# Patient Record
Sex: MALE | Race: WHITE | NOT HISPANIC OR LATINO | Employment: FULL TIME | URBAN - METROPOLITAN AREA
[De-identification: names, ages, dates, MRNs, and addresses within clinical notes are randomized per-mention and may not be internally consistent; named-entity substitution may affect disease eponyms.]

---

## 2020-11-16 ENCOUNTER — OCCUPATIONAL HEALTH (OUTPATIENT)
Dept: URGENT CARE | Facility: CLINIC | Age: 35
End: 2020-11-16

## 2020-11-16 VITALS — TEMPERATURE: 98 F

## 2020-11-16 DIAGNOSIS — Z02.83 ENCOUNTER FOR DRUG SCREENING: Primary | ICD-10-CM

## 2020-11-16 LAB
BREATH ALCOHOL: 0
CTP QC/QA: YES
POC 10 PANEL DRUG SCREEN: NEGATIVE

## 2020-11-16 PROCEDURE — 80305 DRUG TEST PRSMV DIR OPT OBS: CPT | Mod: QW,S$GLB,, | Performed by: EMERGENCY MEDICINE

## 2020-11-16 PROCEDURE — 80305 POCT RAPID DRUG SCREEN 10 PANEL: ICD-10-PCS | Mod: QW,S$GLB,, | Performed by: EMERGENCY MEDICINE

## 2020-11-16 PROCEDURE — 82075 POCT BREATH ALCOHOL TEST: ICD-10-PCS | Mod: S$GLB,,, | Performed by: EMERGENCY MEDICINE

## 2020-11-16 PROCEDURE — 82075 ASSAY OF BREATH ETHANOL: CPT | Mod: S$GLB,,, | Performed by: EMERGENCY MEDICINE

## 2023-11-21 ENCOUNTER — HOSPITAL ENCOUNTER (EMERGENCY)
Facility: HOSPITAL | Age: 38
Discharge: HOME | End: 2023-11-21
Attending: EMERGENCY MEDICINE

## 2023-11-21 VITALS
DIASTOLIC BLOOD PRESSURE: 76 MMHG | HEIGHT: 67 IN | SYSTOLIC BLOOD PRESSURE: 133 MMHG | RESPIRATION RATE: 18 BRPM | HEART RATE: 110 BPM | BODY MASS INDEX: 24.33 KG/M2 | TEMPERATURE: 99 F | WEIGHT: 155 LBS | OXYGEN SATURATION: 100 %
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
## 2023-11-21 PROCEDURE — 99284 EMERGENCY DEPT VISIT MOD MDM: CPT | Mod: ER

## 2023-11-21 PROCEDURE — 96372 THER/PROPH/DIAG INJ SC/IM: CPT | Performed by: PHYSICIAN ASSISTANT

## 2023-11-21 PROCEDURE — 90471 IMMUNIZATION ADMIN: CPT | Mod: ER | Performed by: PHYSICIAN ASSISTANT

## 2023-11-21 PROCEDURE — 63600175 PHARM REV CODE 636 W HCPCS: Mod: ER | Performed by: PHYSICIAN ASSISTANT

## 2023-11-21 PROCEDURE — 90715 TDAP VACCINE 7 YRS/> IM: CPT | Mod: ER | Performed by: PHYSICIAN ASSISTANT

## 2023-11-21 RX ORDER — HYDROCODONE BITARTRATE AND ACETAMINOPHEN 5; 325 MG/1; MG/1
1 TABLET ORAL EVERY 6 HOURS PRN
Qty: 12 TABLET | Refills: 0 | OUTPATIENT
Start: 2023-11-21 | End: 2023-12-06

## 2023-11-21 RX ORDER — KETOROLAC TROMETHAMINE 30 MG/ML
30 INJECTION, SOLUTION INTRAMUSCULAR; INTRAVENOUS
Status: COMPLETED | OUTPATIENT
Start: 2023-11-21 | End: 2023-11-21

## 2023-11-21 RX ORDER — AMOXICILLIN AND CLAVULANATE POTASSIUM 875; 125 MG/1; MG/1
1 TABLET, FILM COATED ORAL 2 TIMES DAILY
Qty: 14 TABLET | Refills: 0 | Status: SHIPPED | OUTPATIENT
Start: 2023-11-21

## 2023-11-21 RX ADMIN — KETOROLAC TROMETHAMINE 30 MG: 30 INJECTION, SOLUTION INTRAMUSCULAR; INTRAVENOUS at 01:11

## 2023-11-21 RX ADMIN — TETANUS TOXOID, REDUCED DIPHTHERIA TOXOID AND ACELLULAR PERTUSSIS VACCINE, ADSORBED 0.5 ML: 5; 2.5; 8; 8; 2.5 SUSPENSION INTRAMUSCULAR at 01:11

## 2023-11-21 NOTE — ED PROVIDER NOTES
Encounter Date: 11/21/2023       History     Chief Complaint   Patient presents with    Animal Bite      Patient reports that he was trying to stop his dogs from fighting. Dog bite noted to the left hand      This is a 38-year-old white male who is otherwise healthy that presents the ED with multiple puncture wounds noted to the left hand and fingers as well as left hand pain following him trying to break up an altercation between his 2 pit bulls.  He states they both recently had puppies and he woke up from a nap and they were fighting.  He states instincts took over and jumped in the middle of it and was bitten on multiple occasions.  He was unable to quantify his pain but does state that it as aching and throbbing.  He is able to move his fingers but not at baseline.  He denies any numbness, tingling, or altered sensation.  He is unsure of his last tetanus shot.      Review of patient's allergies indicates:  No Known Allergies  No past medical history on file.  No past surgical history on file.  No family history on file.     Review of Systems   Constitutional:  Negative for fever.   Respiratory:  Negative for cough and shortness of breath.    Cardiovascular:  Negative for chest pain and palpitations.   Gastrointestinal:  Negative for nausea and vomiting.   Skin:  Positive for wound.   Psychiatric/Behavioral:  The patient is nervous/anxious.        Physical Exam     Initial Vitals [11/21/23 1229]   BP Pulse Resp Temp SpO2   133/76 110 18 98.6 °F (37 °C) 100 %      MAP       --         Physical Exam    Constitutional: He appears well-developed and well-nourished.   HENT:   Head: Normocephalic and atraumatic.   Cardiovascular:  Normal rate, regular rhythm and normal heart sounds.           Pulmonary/Chest: Breath sounds normal. He has no wheezes.   Musculoskeletal:      Right hand: Normal.      Left hand: Swelling and bony tenderness present. No deformity or lacerations. Decreased range of motion. Normal strength.  Normal sensation. Normal capillary refill. Normal pulse.      Comments: There are 6 puncture wounds noted over the posterior aspect of the left 2nd and 3rd fingers.  There is also a singular puncture wound with small skin tear noted over the anterior portion of the left palm over the distal 2nd metacarpal.  The patient has some swelling noted as well over these areas.  He has decreased range of motion secondary to pain.  Motor and sensory intact.  2+ radial pulse.     Neurological: He is alert and oriented to person, place, and time. He has normal strength and normal reflexes.   Skin: Capillary refill takes less than 2 seconds.   Psychiatric: He has a normal mood and affect. Thought content normal.         ED Course   Procedures  Labs Reviewed - No data to display       Imaging Results              X-Ray Hand 3 View Left (Final result)  Result time 11/21/23 13:18:31      Final result by Tom Tracy MD (11/21/23 13:18:31)                   Impression:      No acute osseous finding.  No radiopaque foreign body.      Electronically signed by: Tom Tracy  Date:    11/21/2023  Time:    13:18               Narrative:    EXAMINATION:  XR HAND COMPLETE 3 VIEW LEFT    CLINICAL HISTORY:  dog bite, puncture wounds;.    TECHNIQUE:  PA, lateral, and oblique views of the left hand were performed.    COMPARISON:  None    FINDINGS:  Remote ulnar styloid fracture.  Remote distal radius ORIF.  No acute fracture or dislocation.  No significant degenerative changes.  No radiopaque foreign body.                                       Medications   Tdap (BOOSTRIX) vaccine injection 0.5 mL (0.5 mLs Intramuscular Given 11/21/23 0025)   ketorolac injection 30 mg (30 mg Intramuscular Given 11/21/23 0168)     Medical Decision Making  This is a 38-year-old white male that presents the ED with multiple puncture wounds noted to the left hand and fingers after trying to break up his 2 pit bulls from fighting each other prior to arrival.   On arrival the patient is afebrile and nontoxic-appearing with stable vital signs.  However, he does appear to be in moderate pain.  Differential diagnoses include but are not limited to:  Dog bite, complex dog bite, skin tear, laceration, cellulitis, abscess, avulsion.  Please see physical exam for further details.  The patient's tetanus was updated while in the ED. the puncture wounds were extensively and aggressively irrigated in the areas were cleaned with saline and Betadine.  The patient was given intramuscular Toradol in the ED which did help with his discomfort.  His injuries did not yield in the lacerations so there was nothing to suture.  He was placed on Augmentin and instructed to aggressively clean the areas with soap and water and leave them open to air after drying them off.  He was also written a prescription for few days of Norco to help with his acute pain following the injuries.  He is to have PCP follow up in the next 2-3 days or return to the ED for worsening.  Strict return precautions have been given for which he verbalized understanding.    Amount and/or Complexity of Data Reviewed  Radiology: ordered.    Risk  Prescription drug management.                                   Clinical Impression:  Final diagnoses:  [W54.0XXA] Dog bite, initial encounter (Primary)          ED Disposition Condition    Discharge Stable          ED Prescriptions       Medication Sig Dispense Start Date End Date Auth. Provider    HYDROcodone-acetaminophen (NORCO) 5-325 mg per tablet Take 1 tablet by mouth every 6 (six) hours as needed for Pain. 12 tablet 11/21/2023 -- Nik Ag PA-C    amoxicillin-clavulanate 875-125mg (AUGMENTIN) 875-125 mg per tablet Take 1 tablet by mouth 2 (two) times daily. 14 tablet 11/21/2023 -- Nik Ag PA-C          Follow-up Information       Follow up With Specialties Details Why Contact [facility] - Emergency Dept Emergency Medicine  If symptoms worsen  [address] [facility] [address] [phone]    Your Doctor  Schedule an appointment as soon as possible for a visit in 2 days               Nik Ag PA-C  11/21/23 1608

## 2023-11-30 NOTE — ED NOTES
Addendum 11/29/23 2012  Pt significant other called facility saying the patient had lost their script . Reported to patient that the provider that saw him was not on tonight and will have to be reevaluated

## 2023-12-06 ENCOUNTER — HOSPITAL ENCOUNTER (EMERGENCY)
Facility: HOSPITAL | Age: 38
Discharge: HOME | End: 2023-12-06
Attending: EMERGENCY MEDICINE

## 2023-12-06 VITALS
WEIGHT: 145 LBS | TEMPERATURE: 99 F | OXYGEN SATURATION: 99 % | SYSTOLIC BLOOD PRESSURE: 147 MMHG | HEART RATE: 105 BPM | BODY MASS INDEX: 22.76 KG/M2 | RESPIRATION RATE: 18 BRPM | HEIGHT: 67 IN | DIASTOLIC BLOOD PRESSURE: 92 MMHG

## 2023-12-06 DIAGNOSIS — S52.501A CLOSED FRACTURE OF DISTAL END OF RIGHT RADIUS, UNSPECIFIED FRACTURE MORPHOLOGY, INITIAL ENCOUNTER: Primary | ICD-10-CM
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
## 2023-12-06 PROCEDURE — 29105 APPLICATION LONG ARM SPLINT: CPT | Mod: RT,ER

## 2023-12-06 PROCEDURE — 99284 EMERGENCY DEPT VISIT MOD MDM: CPT | Mod: 25,ER

## 2023-12-06 PROCEDURE — 25000003 PHARM REV CODE 250: Mod: ER

## 2023-12-06 RX ORDER — HYDROCODONE BITARTRATE AND ACETAMINOPHEN 5; 325 MG/1; MG/1
1 TABLET ORAL
Status: COMPLETED | OUTPATIENT
Start: 2023-12-06 | End: 2023-12-06

## 2023-12-06 RX ORDER — HYDROCODONE BITARTRATE AND ACETAMINOPHEN 5; 325 MG/1; MG/1
1 TABLET ORAL EVERY 6 HOURS PRN
Qty: 12 TABLET | Refills: 0 | Status: SHIPPED | OUTPATIENT
Start: 2023-12-06 | End: 2023-12-09

## 2023-12-06 RX ADMIN — HYDROCODONE BITARTRATE AND ACETAMINOPHEN 1 TABLET: 5; 325 TABLET ORAL at 01:12

## 2023-12-06 NOTE — DISCHARGE INSTRUCTIONS
You have been prescribed Norco (Hydrocodone) for pain. Please do not take this medication while working, drinking alcohol, swimming, or while driving/operating heavy machinery. This medication may cause drowsiness, dizziness, impair judgment, and reduce physical capabilities.You should not drive, operate heavy machinery, or make life changing decisions while taking this medication.This medication contains Tylenol. Please do not take any additional Tylenol while you are taking this medication.     While in the Emergency Department you received medication that may cause drowsiness, dizziness, impaired judgment, and reduced physical capabilities. You should not drive, operate heavy machinery, swim, or make life changing decisions within 24 hours of receiving this medication.      You may also take ibuprofen as needed for pain.  Please elevate your arm when resting.  Someone will be calling you to schedule an appointment with the orthopedic.    SPLINT INSTRUCTIONS: Keep the splint clean and dry at all times; do not insert anything into the splint.  If the splint gets wet, it will no longer be effective and you will need to get it replaced immediately.

## 2023-12-06 NOTE — ED PROVIDER NOTES
Encounter Date: 12/6/2023       History     Chief Complaint   Patient presents with    Right arm pain      Patient reports falling off the roof. States that he fell 9 ft      Concepcion Larson is a 38 y.o. male  has no past medical history on file. presenting to the Emergency Department for right wrist pain following a fall from a roof approximately 1 hour prior to arrival.  Patient is right-hand dominant.  Patient states he braced his fall with his right arm and after impact rolled and hit his head.  Patient landed in the dirt.  No loss of consciousness or head ache.  Reports some dizziness.  No blood thinner use.  Reports some numbness in his right fingers.    The history is provided by the patient.     Review of patient's allergies indicates:  No Known Allergies  No past medical history on file.  No past surgical history on file.  No family history on file.     Review of Systems   Constitutional:  Negative for activity change and fever.   HENT:  Negative for sore throat.    Respiratory:  Negative for shortness of breath.    Cardiovascular:  Negative for chest pain.   Gastrointestinal:  Negative for nausea.   Genitourinary:  Negative for dysuria.   Musculoskeletal:  Positive for arthralgias. Negative for back pain, neck pain and neck stiffness.   Skin:  Negative for rash.   Neurological:  Positive for dizziness. Negative for weakness and headaches.   Hematological:  Does not bruise/bleed easily.   All other systems reviewed and are negative.      Physical Exam     Initial Vitals [12/06/23 1247]   BP Pulse Resp Temp SpO2   (!) 147/92 105 18 98.5 °F (36.9 °C) 99 %      MAP       --         Physical Exam    Nursing note and vitals reviewed.  Constitutional: He appears well-developed and well-nourished. He is not diaphoretic. No distress.   HENT:   Head: Normocephalic and atraumatic.   Right Ear: Hearing, tympanic membrane and external ear normal.   Left Ear: Hearing, tympanic membrane and external ear normal.   Nose:  Nose normal.   Mouth/Throat: Uvula is midline, oropharynx is clear and moist and mucous membranes are normal.   Eyes: Conjunctivae, EOM and lids are normal. Pupils are equal, round, and reactive to light.   Neck: Neck supple.   Normal range of motion.  Cardiovascular:  Normal rate, regular rhythm, normal heart sounds and intact distal pulses.           Pulmonary/Chest: Effort normal and breath sounds normal. No respiratory distress. He has no wheezes. He has no rhonchi.   Abdominal: Abdomen is soft. There is no abdominal tenderness.   Musculoskeletal:      Right shoulder: Normal.      Left shoulder: Normal.      Right elbow: Normal. No swelling.      Left elbow: Normal. No swelling.      Right wrist: Swelling (mild), deformity, tenderness and bony tenderness (radius) present. No snuff box tenderness. Decreased range of motion. Normal pulse.      Right hand: No swelling, tenderness or bony tenderness. Normal range of motion.      Cervical back: Normal range of motion and neck supple. No rigidity. No spinous process tenderness or muscular tenderness. Normal range of motion.     Neurological: He is alert and oriented to person, place, and time. He has normal strength. No cranial nerve deficit or sensory deficit. GCS score is 15. GCS eye subscore is 4. GCS verbal subscore is 5. GCS motor subscore is 6.   Skin: Skin is warm and dry. Capillary refill takes less than 2 seconds. No rash noted.   Psychiatric: He has a normal mood and affect. His behavior is normal. Judgment and thought content normal.         ED Course   Procedures  Labs Reviewed - No data to display       Imaging Results              CT Head Without Contrast (Final result)  Result time 12/06/23 14:04:48      Final result by Meena Salmon MD (Timothy) (12/06/23 14:04:48)                   Impression:      Normal study.    All CT scans at this facility use dose modulation, iterative reconstructions, and/or weight base dosing when appropriate to reduce  radiation dose to as low as reasonably achievable.      Electronically signed by: Meena Salmon MD  Date:    12/06/2023  Time:    14:04               Narrative:    EXAMINATION:  CT HEAD WITHOUT CONTRAST    CLINICAL HISTORY:  Head trauma, moderate-severe;    TECHNIQUE:  Noncontrast images were obtained    COMPARISON:  None    FINDINGS:  No intracranial acute hemorrhage or acute focal brain parenchymal abnormality is identified.  Calvarium is intact.                                       X-Ray Wrist Complete Right (Final result)  Result time 12/06/23 13:43:20      Final result by Meena Salmon (MD Kol[place] (12/06/23 13:43:20)                   Impression:      Nondisplaced fracture of the distal radius.      Electronically signed by: Meena Salmon MD  Date:    12/06/2023  Time:    13:43               Narrative:    EXAMINATION:  XR WRIST COMPLETE 3 VIEWS RIGHT    CLINICAL HISTORY:  Unspecified fall, initial encounter    TECHNIQUE:  Standard radiography performed.  Three views.    COMPARISON:  None    FINDINGS:  Findings are suspicious for nondisplaced fracture of the distal radius.                                       Medications   HYDROcodone-acetaminophen 5-325 mg per tablet 1 tablet (1 tablet Oral Given 12/6/23 1325)     Medical Decision Making  Urgent evaluation of a 38-year-old male presents to the emergency department following a fall off a roof.  Patient reports that he fell 9 ft.  Patient is nontoxic appearing and resting comfortably in bed.  Patient is hypertensive, afebrile, tachycardic.  Patient reports pain is 10/10.  Pertinent physical exam findings above.   My overall impression is right distal radius fracture. Differential Diagnoses: Sprain/strain, fracture, contusion, dislocation, gout, septic arthritis  Sugar tong splint placed for distal radius fracture. Splint applied by RN, on evaluation of splint - adequate NV supply and proper alignment. Instructed the patient to keep splint clean and dry.  Given referral to Orthopedics for follow-up care. Discussed pain control with Norco, Tylenol and ibuprofen.     There does not appear to be any indication for further emergent testing, observation, or hospitalization at this time.  Patient appears stable for and is comfortable with discharge home. The diagnosis, treatment plan, instructions for follow-up as well as ED return precautions were discussed. Advised to follow-up with PCP for outpatient follow-up in 2-3 days. Signs and symptoms that would warrant immediate return to ED were reviewed prior to discharge. All questions and concerns were asked, answered, and addressed. Patient expressed understanding and agreement with the plan.     This case was discussed with my attending, Dr. Blunt who is in agreement with my assessment and plan.      Amount and/or Complexity of Data Reviewed  Radiology: ordered and independent interpretation performed. Decision-making details documented in ED Course.    Risk  Prescription drug management.              Attending Attestation:     Physician Attestation Statement for NP/PA:   I have directed and reviewed the workup performed by the PA/NP.  I performed the substantive portion of the medical decision making.     Other NP/PA Attestation Additions:      Medical Decision Making: Agree with assessment and plan.             ED Course as of 12/06/23 1517   Wed Dec 06, 2023   1357 X-Ray Wrist Complete Right  Independent interpretation by me: acute fracture of the distal radius. I have read the radiologist's report and agree with their findings.   [LH]   1419 CT Head Without Contrast  No acute intracranial abnormality. [LH]      ED Course User Index  [LH] Lottie Rodgers PA-C                             Clinical Impression:  Final diagnoses:  [W19.XXXA] Fall  [S52.501A] Closed fracture of distal end of right radius, unspecified fracture morphology, initial encounter (Primary)          ED Disposition Condition    Discharge Stable           ED Prescriptions       Medication Sig Dispense Start Date End Date Auth. Provider    HYDROcodone-acetaminophen (NORCO) 5-325 mg per tablet Take 1 tablet by mouth every 6 (six) hours as needed for Pain. 12 tablet 12/6/2023 12/9/2023 Lottie Rodgers PA-C          Follow-up Information    None          Lottie Rodgers PA-C  12/06/23 1518       Marco Blunt MD  12/06/23 4750

## 2024-06-03 ENCOUNTER — HOSPITAL ENCOUNTER (EMERGENCY)
Facility: HOSPITAL | Age: 39
Discharge: HOME | End: 2024-06-03
Attending: EMERGENCY MEDICINE

## 2024-06-03 DIAGNOSIS — T40.601A OPIATE OVERDOSE, ACCIDENTAL OR UNINTENTIONAL, INITIAL ENCOUNTER: Primary | ICD-10-CM

## 2024-06-03 PROCEDURE — 99283 EMERGENCY DEPT VISIT LOW MDM: CPT | Mod: ER

## 2024-06-03 RX ORDER — NALOXONE HYDROCHLORIDE 4 MG/.1ML
SPRAY NASAL
Qty: 1 EACH | Refills: 11 | Status: SHIPPED | OUTPATIENT
Start: 2024-06-03

## 2024-06-03 NOTE — ED PROVIDER NOTES
"Encounter Date: 6/3/2024       History     Chief Complaint   Patient presents with    Drug Overdose     Patient arrived via EMS awake alert and oriented. Per EMS he was given 6mg IN narcan for response. Patient admits to heroin and meth use today " I'm not a drug addict though I was doing that to get my back pain away."      HPI  39 y.o.   Took heroin because of back pain, denies SI    Found by EMS apneic, received nasal narcan with response    Not currently vomiting    Review of patient's allergies indicates:  No Known Allergies  History reviewed. No pertinent past medical history.  Past Surgical History:   Procedure Laterality Date    ANTERIOR CRUCIATE LIGAMENT REPAIR Right     WRIST SURGERY Left      No family history on file.  Social History     Tobacco Use    Smoking status: Every Day     Current packs/day: 1.00     Types: Cigarettes    Smokeless tobacco: Never   Substance Use Topics    Drug use: Yes     Types: Methamphetamines     Comment: Heroin     Review of Systems  All systems were reviewed/examined and were negative except as noted in the HPI.    Physical Exam     Initial Vitals [06/03/24 1827]   BP Pulse Resp Temp SpO2   (!) 147/99 (!) 111 16 98.2 °F (36.8 °C) 98 %      MAP       --         Physical Exam    General: the patient is awake, alert, and in no apparent distress.  Head: normocephalic and atraumatic, sclera are clear  Neck: supple without meningismus  Chest: clear to auscultation bilaterally, no respiratory distress  Heart: regular rate and rhythm borderline tachy  ABD soft, nontender, nondistended, no peritoneal signs  Extremities: warm and well perfused  Skin: warm and dry  Psych conversant  Neuro: awake, alert, moving all extremities    ED Course   Procedures  Labs Reviewed - No data to display       Imaging Results    None          Medications - No data to display  Medical Decision Making                 Medical Decision Making:    This is an emergent evaluation of a patient presenting to " the ED.  Nursing notes were reviewed.    I decided to obtain and review old medical records, which showed: ED visits    Critical Care Time    Critical care time was provided for 30 minutes exclusive of other billable procedures and teaching time for the treatment of  obs for resp suppression   where the potential for death, shock, or further decline was possible.  Critical care time can include documentation, discussion with consultants, developing a care plan, as well as direct patient care.    Jim You MD                     Clinical Impression:  Final diagnoses:  [T40.601A] Opiate overdose, accidental or unintentional, initial encounter (Primary)          ED Disposition Condition    Discharge Stable          ED Prescriptions       Medication Sig Dispense Start Date End Date Auth. Provider    naloxone (NARCAN) 4 mg/actuation Spry 4mg by nasal route as needed for opioid overdose; may repeat every 2-3 minutes in alternating nostrils until medical help arrives. Call 911 1 each 6/3/2024 -- Jim You MD          Follow-up Information       Follow up With Specialties Details Why Contact Info Additional Information    [facility] Family Medicine Schedule an appointment as soon as possible for a visit   [address], [address]  [facility] [address] [phone] Please park in [address] or D and use [address] wolf. Take Medical Office Bldg. elevators.          Discharged to home in stable condition, return to ED warnings given, follow up and patient care instructions given.      Farooq You MD, [facility], [facility]  Department of Emergency Medicine       Jim You MD  06/04/24 8177

## 2024-06-03 NOTE — ED NOTES
MD states we will just watch pt at this time.  Pt lying in stretcher with eyes open, calm and cooperative.

## 2024-06-04 VITALS
SYSTOLIC BLOOD PRESSURE: 134 MMHG | HEIGHT: 67 IN | DIASTOLIC BLOOD PRESSURE: 88 MMHG | WEIGHT: 154 LBS | OXYGEN SATURATION: 97 % | HEART RATE: 108 BPM | TEMPERATURE: 98 F | RESPIRATION RATE: 16 BRPM | BODY MASS INDEX: 24.17 KG/M2

## 2025-05-20 DIAGNOSIS — H53.2 DIPLOPIA: Primary | ICD-10-CM

## 2025-05-21 ENCOUNTER — HOSPITAL ENCOUNTER (OUTPATIENT)
Dept: RADIOLOGY | Facility: HOSPITAL | Age: 40
Discharge: HOME | End: 2025-05-21
Attending: STUDENT IN AN ORGANIZED HEALTH CARE EDUCATION/TRAINING PROGRAM
Payer: COMMERCIAL

## 2025-05-21 DIAGNOSIS — H53.2 DIPLOPIA: ICD-10-CM

## 2025-05-21 PROCEDURE — 70480 CT ORBIT/EAR/FOSSA W/O DYE: CPT | Mod: TC,PN

## 2025-05-21 PROCEDURE — 70480 CT ORBIT/EAR/FOSSA W/O DYE: CPT | Mod: 26,,, | Performed by: RADIOLOGY
